# Patient Record
Sex: FEMALE | Race: OTHER | HISPANIC OR LATINO | Employment: OTHER | URBAN - METROPOLITAN AREA
[De-identification: names, ages, dates, MRNs, and addresses within clinical notes are randomized per-mention and may not be internally consistent; named-entity substitution may affect disease eponyms.]

---

## 2023-11-06 PROBLEM — E11.9 DIABETES MELLITUS (MULTI): Status: ACTIVE | Noted: 2023-11-06

## 2023-11-06 PROBLEM — I34.0 MITRAL REGURGITATION: Status: ACTIVE | Noted: 2023-11-06

## 2023-11-06 PROBLEM — U07.1 COVID: Status: ACTIVE | Noted: 2023-11-06

## 2023-11-06 PROBLEM — R00.0 TACHYCARDIA: Status: ACTIVE | Noted: 2023-11-06

## 2023-11-06 PROBLEM — I07.1 TRICUSPID REGURGITATION: Status: ACTIVE | Noted: 2023-11-06

## 2023-11-06 PROBLEM — R94.31 ABNORMAL EKG: Status: ACTIVE | Noted: 2023-11-06

## 2023-11-06 PROBLEM — R73.9 HYPERGLYCEMIA: Status: ACTIVE | Noted: 2023-11-06

## 2023-11-06 PROBLEM — I10 BENIGN ESSENTIAL HYPERTENSION: Status: ACTIVE | Noted: 2023-11-06

## 2023-11-06 PROBLEM — E66.01 MORBID OBESITY WITH BODY MASS INDEX (BMI) OF 40.0 TO 44.9 IN ADULT (MULTI): Status: ACTIVE | Noted: 2023-11-06

## 2023-11-06 PROBLEM — R01.1 HEART MURMUR: Status: ACTIVE | Noted: 2023-11-06

## 2023-11-06 PROBLEM — E78.5 HYPERLIPIDEMIA, UNSPECIFIED: Status: ACTIVE | Noted: 2023-11-06

## 2023-11-06 RX ORDER — ATORVASTATIN CALCIUM 20 MG/1
20 TABLET, FILM COATED ORAL DAILY
COMMUNITY
Start: 2023-01-09 | End: 2024-03-04 | Stop reason: SDUPTHER

## 2023-11-06 RX ORDER — LOSARTAN POTASSIUM 100 MG/1
1 TABLET ORAL DAILY
COMMUNITY
Start: 2022-02-02 | End: 2024-05-31

## 2023-11-06 RX ORDER — KETOROLAC TROMETHAMINE 5 MG/ML
1 SOLUTION OPHTHALMIC
COMMUNITY
Start: 2023-04-04 | End: 2023-11-07 | Stop reason: ALTCHOICE

## 2023-11-06 RX ORDER — LOSARTAN POTASSIUM 100 MG/1
TABLET ORAL
COMMUNITY
Start: 2022-04-05 | End: 2023-11-07 | Stop reason: SDUPTHER

## 2023-11-06 RX ORDER — PREDNISOLONE ACETATE 10 MG/ML
1 SUSPENSION/ DROPS OPHTHALMIC
COMMUNITY
Start: 2023-04-04 | End: 2023-11-07 | Stop reason: ALTCHOICE

## 2023-11-06 RX ORDER — HYDROCHLOROTHIAZIDE 12.5 MG/1
12.5 TABLET ORAL DAILY
COMMUNITY
Start: 2023-04-05 | End: 2024-05-30 | Stop reason: SDUPTHER

## 2023-11-06 RX ORDER — METOPROLOL SUCCINATE 25 MG/1
1 TABLET, EXTENDED RELEASE ORAL DAILY
COMMUNITY
Start: 2022-12-14 | End: 2023-11-07 | Stop reason: ALTCHOICE

## 2023-11-06 RX ORDER — METOPROLOL SUCCINATE 50 MG/1
50 TABLET, EXTENDED RELEASE ORAL DAILY
COMMUNITY
Start: 2023-03-17 | End: 2023-11-07 | Stop reason: SDUPTHER

## 2023-11-07 ENCOUNTER — OFFICE VISIT (OUTPATIENT)
Dept: CARDIOLOGY | Facility: CLINIC | Age: 68
End: 2023-11-07
Payer: MEDICARE

## 2023-11-07 VITALS
DIASTOLIC BLOOD PRESSURE: 86 MMHG | HEART RATE: 72 BPM | SYSTOLIC BLOOD PRESSURE: 130 MMHG | HEIGHT: 61 IN | BODY MASS INDEX: 42.9 KG/M2 | WEIGHT: 227.2 LBS

## 2023-11-07 DIAGNOSIS — R01.1 HEART MURMUR: ICD-10-CM

## 2023-11-07 DIAGNOSIS — I34.0 NONRHEUMATIC MITRAL VALVE REGURGITATION: ICD-10-CM

## 2023-11-07 DIAGNOSIS — E66.01 MORBID OBESITY WITH BODY MASS INDEX (BMI) OF 40.0 TO 44.9 IN ADULT (MULTI): ICD-10-CM

## 2023-11-07 DIAGNOSIS — E78.2 MIXED HYPERLIPIDEMIA: ICD-10-CM

## 2023-11-07 DIAGNOSIS — R94.31 ABNORMAL EKG: ICD-10-CM

## 2023-11-07 DIAGNOSIS — R00.0 TACHYCARDIA: ICD-10-CM

## 2023-11-07 DIAGNOSIS — I10 BENIGN ESSENTIAL HYPERTENSION: Primary | ICD-10-CM

## 2023-11-07 DIAGNOSIS — I36.1 NONRHEUMATIC TRICUSPID VALVE REGURGITATION: ICD-10-CM

## 2023-11-07 DIAGNOSIS — Z87.891 FORMER SMOKER: ICD-10-CM

## 2023-11-07 PROCEDURE — 3008F BODY MASS INDEX DOCD: CPT | Performed by: INTERNAL MEDICINE

## 2023-11-07 PROCEDURE — 3075F SYST BP GE 130 - 139MM HG: CPT | Performed by: INTERNAL MEDICINE

## 2023-11-07 PROCEDURE — 4010F ACE/ARB THERAPY RXD/TAKEN: CPT | Performed by: INTERNAL MEDICINE

## 2023-11-07 PROCEDURE — 99214 OFFICE O/P EST MOD 30 MIN: CPT | Performed by: INTERNAL MEDICINE

## 2023-11-07 PROCEDURE — 3079F DIAST BP 80-89 MM HG: CPT | Performed by: INTERNAL MEDICINE

## 2023-11-07 PROCEDURE — 1159F MED LIST DOCD IN RCRD: CPT | Performed by: INTERNAL MEDICINE

## 2023-11-07 PROCEDURE — 1036F TOBACCO NON-USER: CPT | Performed by: INTERNAL MEDICINE

## 2023-11-07 RX ORDER — METOPROLOL SUCCINATE 50 MG/1
50 TABLET, EXTENDED RELEASE ORAL DAILY
Qty: 90 TABLET | Refills: 3 | Status: SHIPPED | OUTPATIENT
Start: 2023-11-07

## 2023-11-07 ASSESSMENT — ENCOUNTER SYMPTOMS
RESPIRATORY NEGATIVE: 1
CARDIOVASCULAR NEGATIVE: 1
NEUROLOGICAL NEGATIVE: 1
CONSTITUTIONAL NEGATIVE: 1

## 2023-11-07 NOTE — PROGRESS NOTES
CARDIOLOGY OFFICE VISIT      CHIEF COMPLAINT  Chief Complaint   Patient presents with    Follow-up        HISTORY OF PRESENT ILLNESS  Ilda Cronin is a 68 y.o. year old female patient with hypertension resting tachycardia who apparently ran out of her metoprolol and subsequently developed recurrent tachycardia which has not improved since she got some supplies.  Work-up includes an echocardiogram that shows normal LV function with trace mitral and tricuspid regurgitation in January 2023.  She continues to do well denying any chest pain palpitations presyncope or syncope.  She does have increased cholesterol with an LDL of 150 for which she resumed her Lipitor and repeat blood test is pending at this time.    ASSESSMENT AND PLAN  1.  Hypertension: Blood pressures well controlled on current medications which we will continue.  2.  Dyslipidemia: We have requested for repeat lipids and LFTs prior to next follow-up.  3.  Overweight: She has managed to lose over 10 pounds since her last visit and she is encouraged to continue with aggressive weight loss.  4.  Resting tachycardia: We will continue with current dose of metoprolol  Problem List Items Addressed This Visit       Abnormal EKG    Benign essential hypertension - Primary    Heart murmur    Hyperlipidemia, unspecified    Tachycardia    Mitral regurgitation    Morbid obesity with body mass index (BMI) of 40.0 to 44.9 in adult (CMS/[facility])    Tricuspid regurgitation    Former smoker       Past Medical History  History reviewed. No pertinent past medical history.    Social History  Social History     Tobacco Use    Smoking status: Former     Types: Cigarettes    Smokeless tobacco: Never   Substance Use Topics    Alcohol use: Not Currently     Comment: rarely    Drug use: Never       Family History     Family History   Problem Relation Name Age of Onset    Thyroid disease Mother      Other (cardiac pacemaker) Father      Hypertension Father          Allergies:  No Known  "Allergies     Outpatient Medications:  Current Outpatient Medications   Medication Instructions    atorvastatin (Lipitor) 20 mg tablet Take 1 tablet (20 mg) by mouth once daily.    hydroCHLOROthiazide (HYDRODIURIL) 12.5 mg, oral, Daily, In the morning    losartan (Cozaar) 100 mg tablet 1 tablet, oral, Daily    metoprolol succinate XL (Toprol-XL) 50 mg 24 hr tablet 1 tablet (50 mg) once daily.        Recent Lab Results:    CBC:   Lab Results   Component Value Date    WBC 8.1 01/06/2023    RBC 4.90 01/06/2023    HGB 14.7 01/06/2023    HCT 44.8 01/06/2023     01/06/2023        CMP:    Lab Results   Component Value Date     01/06/2023    K 4.3 01/06/2023     01/06/2023    CO2 29 01/06/2023    BUN 13 01/06/2023    CREATININE 0.81 01/06/2023    GLUCOSE 109 (H) 01/06/2023    CALCIUM 9.3 01/06/2023       Magnesium:    No results found for: \"MG\"    Lipid Profile:    Lab Results   Component Value Date    TRIG 81 01/06/2023    HDL 56.7 01/06/2023       TSH:    Lab Results   Component Value Date    TSH 1.24 01/06/2023       BNP:   No results found for: \"BNP\"     PT/INR:    No results found for: \"PROTIME\", \"INR\"    HgBA1c:    Lab Results   Component Value Date    HGBA1C 7.1 (A) 02/02/2022       BMP:  Lab Results   Component Value Date     01/06/2023     02/02/2022    K 4.3 01/06/2023    K 4.1 02/02/2022     01/06/2023     02/02/2022    CO2 29 01/06/2023    CO2 26 02/02/2022    BUN 13 01/06/2023    BUN 11 02/02/2022    CREATININE 0.81 01/06/2023    CREATININE 0.76 02/02/2022       CBC:  Lab Results   Component Value Date    WBC 8.1 01/06/2023    WBC 9.9 02/02/2022    RBC 4.90 01/06/2023    RBC 4.98 02/02/2022    HGB 14.7 01/06/2023    HGB 15.0 02/02/2022    HCT 44.8 01/06/2023    HCT 45.3 02/02/2022    MCV 91 01/06/2023    MCV 91 02/02/2022    MCHC 32.8 01/06/2023    MCHC 33.1 02/02/2022    RDW 12.1 01/06/2023    RDW 12.6 02/02/2022     01/06/2023     02/02/2022 " "      Cardiac Enzymes:    No results found for: \"TROPHS\"    Hepatic Function Panel:    Lab Results   Component Value Date    ALKPHOS 119 01/06/2023    ALT 18 01/06/2023    AST 15 01/06/2023    PROT 7.5 01/06/2023    BILITOT 0.5 01/06/2023       Recent Cardiovascular Testing:    Echo-  Stress-  Cath-    REVIEW OF SYSTEMS  Review of Systems   Constitutional: Negative.   Cardiovascular: Negative.    Respiratory: Negative.     Neurological: Negative.    All other systems reviewed and are negative.      VITALS  Vitals:    11/07/23 1441   BP: 130/86   Pulse: 72     Wt Readings from Last 4 Encounters:   11/07/23 103 kg (227 lb 3.2 oz)   03/17/23 105 kg (232 lb 8 oz)   02/06/23 102 kg (225 lb)   01/30/23 104 kg (228 lb 4 oz)       PHYSICAL EXAM  Constitutional:       Appearance: Healthy appearance. Not in distress.   Eyes:      Conjunctiva/sclera: Conjunctivae normal.      Pupils: Pupils are equal, round, and reactive to light.   Neck:      Vascular: No JVR. JVD normal.   Pulmonary:      Effort: Pulmonary effort is normal.      Breath sounds: Normal breath sounds. No wheezing. No rhonchi. No rales.   Chest:      Chest wall: Not tender to palpatation.   Cardiovascular:      PMI at left midclavicular line. Normal rate. Regular rhythm. Normal S1. Normal S2.       Murmurs: There is no murmur.      No gallop.  No click. No rub.   Pulses:     Intact distal pulses.   Edema:     Peripheral edema absent.   Abdominal:      Tenderness: There is no abdominal tenderness.   Musculoskeletal: Normal range of motion.         General: No tenderness.      Cervical back: Normal range of motion. Skin:     General: Skin is warm and dry.   Neurological:      General: No focal deficit present.      Mental Status: Alert and oriented to person, place and time.             [unfilled]   "

## 2024-02-07 ENCOUNTER — APPOINTMENT (OUTPATIENT)
Dept: CARDIOLOGY | Facility: CLINIC | Age: 69
End: 2024-02-07
Payer: MEDICARE

## 2024-02-21 ENCOUNTER — TELEPHONE (OUTPATIENT)
Dept: PRIMARY CARE | Facility: CLINIC | Age: 69
End: 2024-02-21
Payer: MEDICARE

## 2024-02-21 DIAGNOSIS — E55.9 VITAMIN D INSUFFICIENCY: ICD-10-CM

## 2024-02-21 DIAGNOSIS — Z00.00 ENCOUNTER FOR WELLNESS EXAMINATION: ICD-10-CM

## 2024-02-21 DIAGNOSIS — I10 ESSENTIAL (PRIMARY) HYPERTENSION: Primary | ICD-10-CM

## 2024-02-21 NOTE — TELEPHONE ENCOUNTER
Patient called to request lab orders to be done prior to 3/18/24 upcoming appointment.  Also stated that she has an upcoming appointment with Dr. Muhammad 3/8/24

## 2024-02-27 ENCOUNTER — APPOINTMENT (OUTPATIENT)
Dept: CARDIOLOGY | Facility: CLINIC | Age: 69
End: 2024-02-27
Payer: MEDICARE

## 2024-03-04 DIAGNOSIS — E78.5 HYPERLIPIDEMIA, UNSPECIFIED HYPERLIPIDEMIA TYPE: ICD-10-CM

## 2024-03-04 RX ORDER — ATORVASTATIN CALCIUM 20 MG/1
20 TABLET, FILM COATED ORAL DAILY
Qty: 90 TABLET | Refills: 3 | Status: SHIPPED | OUTPATIENT
Start: 2024-03-04 | End: 2025-03-04

## 2024-03-08 ENCOUNTER — APPOINTMENT (OUTPATIENT)
Dept: CARDIOLOGY | Facility: CLINIC | Age: 69
End: 2024-03-08
Payer: MEDICARE

## 2024-03-12 ENCOUNTER — APPOINTMENT (OUTPATIENT)
Dept: CARDIOLOGY | Facility: CLINIC | Age: 69
End: 2024-03-12
Payer: MEDICARE

## 2024-03-18 ENCOUNTER — APPOINTMENT (OUTPATIENT)
Dept: PRIMARY CARE | Facility: CLINIC | Age: 69
End: 2024-03-18
Payer: MEDICARE

## 2024-03-26 ENCOUNTER — APPOINTMENT (OUTPATIENT)
Dept: CARDIOLOGY | Facility: CLINIC | Age: 69
End: 2024-03-26
Payer: MEDICARE

## 2024-05-30 DIAGNOSIS — I10 BENIGN ESSENTIAL HYPERTENSION: ICD-10-CM

## 2024-05-30 NOTE — TELEPHONE ENCOUNTER
Received request for prescription refill for patient.  Patient follows with Dr. Damian Muhammad MD     Request is for hydrochlorothiazide and losartan  Is patient currently on medication- yes     Last OV- 11/7/23  Next OV- 7/1/24    Pended for signing and sent to provider.

## 2024-05-31 RX ORDER — LOSARTAN POTASSIUM 100 MG/1
100 TABLET ORAL DAILY
Qty: 90 TABLET | Refills: 3 | Status: SHIPPED | OUTPATIENT
Start: 2024-05-31 | End: 2025-05-31

## 2024-05-31 RX ORDER — HYDROCHLOROTHIAZIDE 12.5 MG/1
12.5 TABLET ORAL DAILY
Qty: 90 TABLET | Refills: 3 | Status: SHIPPED | OUTPATIENT
Start: 2024-05-31 | End: 2025-05-31

## 2024-06-10 ENCOUNTER — LAB (OUTPATIENT)
Dept: LAB | Facility: LAB | Age: 69
End: 2024-06-10
Payer: MEDICARE

## 2024-06-10 DIAGNOSIS — Z00.00 ENCOUNTER FOR WELLNESS EXAMINATION: ICD-10-CM

## 2024-06-10 DIAGNOSIS — I10 ESSENTIAL (PRIMARY) HYPERTENSION: ICD-10-CM

## 2024-06-10 DIAGNOSIS — E55.9 VITAMIN D INSUFFICIENCY: ICD-10-CM

## 2024-06-10 LAB
25(OH)D3 SERPL-MCNC: 27 NG/ML (ref 30–100)
ALBUMIN SERPL BCP-MCNC: 4.2 G/DL (ref 3.4–5)
ALP SERPL-CCNC: 121 U/L (ref 33–136)
ALT SERPL W P-5'-P-CCNC: 22 U/L (ref 7–45)
ANION GAP SERPL CALC-SCNC: 12 MMOL/L (ref 10–20)
APPEARANCE UR: CLEAR
AST SERPL W P-5'-P-CCNC: 16 U/L (ref 9–39)
BASOPHILS # BLD AUTO: 0.02 X10*3/UL (ref 0–0.1)
BASOPHILS NFR BLD AUTO: 0.3 %
BILIRUB SERPL-MCNC: 0.5 MG/DL (ref 0–1.2)
BILIRUB UR STRIP.AUTO-MCNC: NEGATIVE MG/DL
BUN SERPL-MCNC: 13 MG/DL (ref 6–23)
CALCIUM SERPL-MCNC: 9.5 MG/DL (ref 8.6–10.3)
CHLORIDE SERPL-SCNC: 102 MMOL/L (ref 98–107)
CHOLEST SERPL-MCNC: 150 MG/DL (ref 0–199)
CHOLESTEROL/HDL RATIO: 3.3
CO2 SERPL-SCNC: 30 MMOL/L (ref 21–32)
COLOR UR: YELLOW
CREAT SERPL-MCNC: 0.84 MG/DL (ref 0.5–1.05)
EGFRCR SERPLBLD CKD-EPI 2021: 75 ML/MIN/1.73M*2
EOSINOPHIL # BLD AUTO: 0.1 X10*3/UL (ref 0–0.7)
EOSINOPHIL NFR BLD AUTO: 1.3 %
ERYTHROCYTE [DISTWIDTH] IN BLOOD BY AUTOMATED COUNT: 12.3 % (ref 11.5–14.5)
GLUCOSE SERPL-MCNC: 132 MG/DL (ref 74–99)
GLUCOSE UR STRIP.AUTO-MCNC: NEGATIVE MG/DL
HCT VFR BLD AUTO: 44.5 % (ref 36–46)
HDLC SERPL-MCNC: 45.3 MG/DL
HGB BLD-MCNC: 15 G/DL (ref 12–16)
IMM GRANULOCYTES # BLD AUTO: 0.01 X10*3/UL (ref 0–0.7)
IMM GRANULOCYTES NFR BLD AUTO: 0.1 % (ref 0–0.9)
KETONES UR STRIP.AUTO-MCNC: NEGATIVE MG/DL
LDLC SERPL CALC-MCNC: 85 MG/DL
LEUKOCYTE ESTERASE UR QL STRIP.AUTO: NEGATIVE
LYMPHOCYTES # BLD AUTO: 3.45 X10*3/UL (ref 1.2–4.8)
LYMPHOCYTES NFR BLD AUTO: 44.2 %
MCH RBC QN AUTO: 31.4 PG (ref 26–34)
MCHC RBC AUTO-ENTMCNC: 33.7 G/DL (ref 32–36)
MCV RBC AUTO: 93 FL (ref 80–100)
MONOCYTES # BLD AUTO: 0.5 X10*3/UL (ref 0.1–1)
MONOCYTES NFR BLD AUTO: 6.4 %
NEUTROPHILS # BLD AUTO: 3.72 X10*3/UL (ref 1.2–7.7)
NEUTROPHILS NFR BLD AUTO: 47.7 %
NITRITE UR QL STRIP.AUTO: NEGATIVE
NON HDL CHOLESTEROL: 105 MG/DL (ref 0–149)
NRBC BLD-RTO: 0 /100 WBCS (ref 0–0)
PH UR STRIP.AUTO: 5 [PH]
PLATELET # BLD AUTO: 293 X10*3/UL (ref 150–450)
POTASSIUM SERPL-SCNC: 4 MMOL/L (ref 3.5–5.3)
PROT SERPL-MCNC: 7 G/DL (ref 6.4–8.2)
PROT UR STRIP.AUTO-MCNC: NEGATIVE MG/DL
RBC # BLD AUTO: 4.78 X10*6/UL (ref 4–5.2)
RBC # UR STRIP.AUTO: NEGATIVE /UL
SODIUM SERPL-SCNC: 140 MMOL/L (ref 136–145)
SP GR UR STRIP.AUTO: 1.02
TRIGL SERPL-MCNC: 99 MG/DL (ref 0–149)
TSH SERPL-ACNC: 0.66 MIU/L (ref 0.44–3.98)
UROBILINOGEN UR STRIP.AUTO-MCNC: <2 MG/DL
VLDL: 20 MG/DL (ref 0–40)
WBC # BLD AUTO: 7.8 X10*3/UL (ref 4.4–11.3)

## 2024-06-10 PROCEDURE — 80053 COMPREHEN METABOLIC PANEL: CPT

## 2024-06-10 PROCEDURE — 82306 VITAMIN D 25 HYDROXY: CPT

## 2024-06-10 PROCEDURE — 80061 LIPID PANEL: CPT

## 2024-06-10 PROCEDURE — 36415 COLL VENOUS BLD VENIPUNCTURE: CPT

## 2024-06-10 PROCEDURE — 84443 ASSAY THYROID STIM HORMONE: CPT

## 2024-06-10 PROCEDURE — 81003 URINALYSIS AUTO W/O SCOPE: CPT

## 2024-06-10 PROCEDURE — 85025 COMPLETE CBC W/AUTO DIFF WBC: CPT

## 2024-06-11 ENCOUNTER — OFFICE VISIT (OUTPATIENT)
Dept: PRIMARY CARE | Facility: CLINIC | Age: 69
End: 2024-06-11
Payer: MEDICARE

## 2024-06-11 VITALS
HEIGHT: 62 IN | HEART RATE: 67 BPM | SYSTOLIC BLOOD PRESSURE: 162 MMHG | WEIGHT: 232.2 LBS | TEMPERATURE: 97.6 F | OXYGEN SATURATION: 96 % | DIASTOLIC BLOOD PRESSURE: 78 MMHG | BODY MASS INDEX: 42.73 KG/M2

## 2024-06-11 DIAGNOSIS — Z78.0 ENCOUNTER FOR OSTEOPOROSIS SCREENING IN ASYMPTOMATIC POSTMENOPAUSAL PATIENT: ICD-10-CM

## 2024-06-11 DIAGNOSIS — E66.01 MORBID OBESITY WITH BODY MASS INDEX (BMI) OF 40.0 TO 44.9 IN ADULT (MULTI): ICD-10-CM

## 2024-06-11 DIAGNOSIS — R73.9 HYPERGLYCEMIA: Primary | ICD-10-CM

## 2024-06-11 DIAGNOSIS — E78.5 HYPERLIPIDEMIA, UNSPECIFIED HYPERLIPIDEMIA TYPE: ICD-10-CM

## 2024-06-11 DIAGNOSIS — Z12.31 SCREENING MAMMOGRAM FOR BREAST CANCER: ICD-10-CM

## 2024-06-11 DIAGNOSIS — Z13.1 SCREENING FOR DIABETES MELLITUS (DM): ICD-10-CM

## 2024-06-11 DIAGNOSIS — M81.0 AGE-RELATED OSTEOPOROSIS WITHOUT CURRENT PATHOLOGICAL FRACTURE: ICD-10-CM

## 2024-06-11 DIAGNOSIS — Z00.00 ENCOUNTER FOR SUBSEQUENT ANNUAL WELLNESS VISIT (AWV) IN MEDICARE PATIENT: Primary | ICD-10-CM

## 2024-06-11 DIAGNOSIS — Z13.820 ENCOUNTER FOR OSTEOPOROSIS SCREENING IN ASYMPTOMATIC POSTMENOPAUSAL PATIENT: ICD-10-CM

## 2024-06-11 DIAGNOSIS — Z12.11 SCREENING FOR COLON CANCER: ICD-10-CM

## 2024-06-11 LAB — POC HEMOGLOBIN A1C: 6.8 % (ref 4.2–6.5)

## 2024-06-11 PROCEDURE — 3077F SYST BP >= 140 MM HG: CPT | Performed by: INTERNAL MEDICINE

## 2024-06-11 PROCEDURE — 4010F ACE/ARB THERAPY RXD/TAKEN: CPT | Performed by: INTERNAL MEDICINE

## 2024-06-11 PROCEDURE — 3008F BODY MASS INDEX DOCD: CPT | Performed by: INTERNAL MEDICINE

## 2024-06-11 PROCEDURE — 3048F LDL-C <100 MG/DL: CPT | Performed by: INTERNAL MEDICINE

## 2024-06-11 PROCEDURE — 1170F FXNL STATUS ASSESSED: CPT | Performed by: INTERNAL MEDICINE

## 2024-06-11 PROCEDURE — 83036 HEMOGLOBIN GLYCOSYLATED A1C: CPT | Performed by: INTERNAL MEDICINE

## 2024-06-11 PROCEDURE — 3078F DIAST BP <80 MM HG: CPT | Performed by: INTERNAL MEDICINE

## 2024-06-11 PROCEDURE — 1124F ACP DISCUSS-NO DSCNMKR DOCD: CPT | Performed by: INTERNAL MEDICINE

## 2024-06-11 PROCEDURE — 1159F MED LIST DOCD IN RCRD: CPT | Performed by: INTERNAL MEDICINE

## 2024-06-11 PROCEDURE — 99397 PER PM REEVAL EST PAT 65+ YR: CPT | Performed by: INTERNAL MEDICINE

## 2024-06-11 ASSESSMENT — ACTIVITIES OF DAILY LIVING (ADL)
DOING_HOUSEWORK: INDEPENDENT
DRESSING: INDEPENDENT
MANAGING_FINANCES: INDEPENDENT
GROCERY_SHOPPING: INDEPENDENT
BATHING: INDEPENDENT
TAKING_MEDICATION: INDEPENDENT

## 2024-06-11 ASSESSMENT — ENCOUNTER SYMPTOMS
DEPRESSION: 0
LOSS OF SENSATION IN FEET: 0
OCCASIONAL FEELINGS OF UNSTEADINESS: 1

## 2024-06-11 ASSESSMENT — PATIENT HEALTH QUESTIONNAIRE - PHQ9
2. FEELING DOWN, DEPRESSED OR HOPELESS: NOT AT ALL
SUM OF ALL RESPONSES TO PHQ9 QUESTIONS 1 AND 2: 0
1. LITTLE INTEREST OR PLEASURE IN DOING THINGS: NOT AT ALL

## 2024-06-11 NOTE — PROGRESS NOTES
"CHIEF COMPLAINT:   Chief Complaint   Patient presents with    Annual Wellness Visit         HISTORY OF PRESENT ILLNESS:   Ilda Cronin is otherwise doing well. Chronic medical conditions are stable with current medical regimen. Cognitive functions are intact and stable. Immunizations are age appropriately up-to-date. Today patient does not have any acute medical complaint. We reconciled home medications. . Discussed about the preventative kojo like cancer screening, routine blood work, immunizations. The healthy lifestyle has been reinforced. Encouraged continued avoidance of tobacco alcohol substances of abuse. Functional capacity has been assessed. The depression screening questionnaire has been reviewed, spent 5 minutes discussing with patient. . Discussed safety measures and advanced directives, Med refills were sent.  Vital signs reviewed.  The due lab orders, if any were provided.  All the other components of annual wellness has been further narrated below. Patient will return for follow up as per schedule.       Review of Systems   Constitutional:  Negative for activity change and fever.   HENT:  Negative for hearing loss, nosebleeds and tinnitus.    Eyes:  Negative for redness.   Respiratory:  Negative for chest tightness and stridor.    Cardiovascular:  Negative for chest pain, palpitations and leg swelling.   Gastrointestinal:  Negative for blood in stool.   Endocrine: Negative for cold intolerance.   Genitourinary:  Negative for hematuria.   Musculoskeletal:  Negative for joint swelling.   Skin:  Negative for rash.   Neurological:  Negative for speech difficulty and light-headedness.   Psychiatric/Behavioral:  Negative for behavioral problems.        Visit Vitals  /78 (BP Location: Left arm, Patient Position: Sitting)   Pulse 67   Temp 36.4 °C (97.6 °F)   Ht 1.562 m (5' 1.5\")   Wt 105 kg (232 lb 3.2 oz)   SpO2 96% Comment: RA   BMI 43.16 kg/m²   Smoking Status Former   BSA 2.13 m²           Wt " Readings from Last 10 Encounters:   06/11/24 105 kg (232 lb 3.2 oz)   11/07/23 103 kg (227 lb 3.2 oz)   03/17/23 105 kg (232 lb 8 oz)   02/06/23 102 kg (225 lb)   01/30/23 104 kg (228 lb 4 oz)   01/09/23 104 kg (228 lb 3 oz)   12/14/22 102 kg (225 lb)   02/02/22 107 kg (236 lb)        Physical Exam  Constitutional:       General: She is not in acute distress.     Appearance: Normal appearance.   HENT:      Head: Normocephalic.      Right Ear: Tympanic membrane normal.      Left Ear: Tympanic membrane normal.      Mouth/Throat:      Mouth: Mucous membranes are moist.   Cardiovascular:      Rate and Rhythm: Normal rate and regular rhythm.      Heart sounds: No murmur heard.  Pulmonary:      Effort: No respiratory distress.   Abdominal:      Palpations: Abdomen is soft.   Musculoskeletal:      Cervical back: Neck supple.      Right lower leg: No edema.      Left lower leg: No edema.   Skin:     Findings: No rash.   Neurological:      General: No focal deficit present.      Mental Status: She is alert and oriented to person, place, and time.   Psychiatric:         Mood and Affect: Mood normal.            RECENT LABS:  Lab Results   Component Value Date    WBC 7.8 06/10/2024    HGB 15.0 06/10/2024    HCT 44.5 06/10/2024     06/10/2024    CHOL 150 06/10/2024    TRIG 99 06/10/2024    HDL 45.3 06/10/2024    ALT 22 06/10/2024    AST 16 06/10/2024     06/10/2024    K 4.0 06/10/2024     06/10/2024    CREATININE 0.84 06/10/2024    BUN 13 06/10/2024    CO2 30 06/10/2024    TSH 0.66 06/10/2024    HGBA1C 6.8 (A) 06/11/2024       IMAGING:  No results found.     MEDICATIONS:   Current Outpatient Medications   Medication Instructions    atorvastatin (LIPITOR) 20 mg, oral, Daily    ergocalciferol (VITAMIN D-2) 50,000 Units, oral, Once Weekly    hydroCHLOROthiazide (MICROZIDE) 12.5 mg, oral, Daily, In the morning    losartan (COZAAR) 100 mg, oral, Daily    metoprolol succinate XL (TOPROL-XL) 50 mg, oral, Daily         TODAY'S VISIT  DX:   1. Encounter for subsequent annual wellness visit (AWV) in Medicare patient        2. Morbid obesity with body mass index (BMI) of 40.0 to 44.9 in adult (Multi)        3. Hyperglycemia  POCT glycosylated hemoglobin (Hb A1C) manually resulted      4. Screening for diabetes mellitus (DM)  POCT glycosylated hemoglobin (Hb A1C) manually resulted      5. Screening mammogram for breast cancer  BI mammo bilateral screening tomosynthesis      6. Screening for colon cancer  Colonoscopy Screening; Average Risk Patient      7. Age-related osteoporosis without current pathological fracture  XR DEXA bone density      8. Encounter for osteoporosis screening in asymptomatic postmenopausal patient  XR DEXA bone density      9. Hyperlipidemia, unspecified hyperlipidemia type               MEDICAL DECISION MAKING:   Recent lab work and relevant imaging studies have been reviewed.  Relevant correspondence/notes from specialty consultants were reviewed and discussed with patient.  The current active medical co morbidities have been considered.  Patient's cancer screening tests are up-to-date.  Medication have been sent for refill.  Patient will continue with current medical therapy and plan.  Immunizations are up-to-date.  Relevant orders are in the chart for this visit.  I will see patient back in 3 months.      PS: This note was completed using Dragon voice recognition technology and may include unintended errors with respect to translation of words, typographical errors or grammar errors which may not have been identified while finalizing the chart.

## 2024-06-14 RX ORDER — ERGOCALCIFEROL 1.25 MG/1
50000 CAPSULE ORAL
Qty: 12 CAPSULE | Refills: 1 | Status: SHIPPED | OUTPATIENT
Start: 2024-06-16 | End: 2025-06-16

## 2024-07-01 ENCOUNTER — APPOINTMENT (OUTPATIENT)
Dept: CARDIOLOGY | Facility: CLINIC | Age: 69
End: 2024-07-01
Payer: MEDICARE

## 2024-07-01 VITALS
HEART RATE: 80 BPM | BODY MASS INDEX: 42.07 KG/M2 | WEIGHT: 228.6 LBS | SYSTOLIC BLOOD PRESSURE: 172 MMHG | HEIGHT: 62 IN | DIASTOLIC BLOOD PRESSURE: 104 MMHG

## 2024-07-01 DIAGNOSIS — I34.0 NONRHEUMATIC MITRAL VALVE REGURGITATION: ICD-10-CM

## 2024-07-01 DIAGNOSIS — E66.01 MORBID OBESITY WITH BODY MASS INDEX (BMI) OF 40.0 TO 44.9 IN ADULT (MULTI): ICD-10-CM

## 2024-07-01 DIAGNOSIS — R00.0 TACHYCARDIA: ICD-10-CM

## 2024-07-01 DIAGNOSIS — Z87.891 FORMER SMOKER: ICD-10-CM

## 2024-07-01 DIAGNOSIS — I10 BENIGN ESSENTIAL HYPERTENSION: ICD-10-CM

## 2024-07-01 DIAGNOSIS — R01.1 HEART MURMUR: ICD-10-CM

## 2024-07-01 DIAGNOSIS — I36.1 NONRHEUMATIC TRICUSPID VALVE REGURGITATION: ICD-10-CM

## 2024-07-01 DIAGNOSIS — E08.00 DIABETES MELLITUS DUE TO UNDERLYING CONDITION WITH HYPEROSMOLARITY WITHOUT COMA, WITHOUT LONG-TERM CURRENT USE OF INSULIN (MULTI): ICD-10-CM

## 2024-07-01 PROCEDURE — 99214 OFFICE O/P EST MOD 30 MIN: CPT | Performed by: INTERNAL MEDICINE

## 2024-07-01 PROCEDURE — 3008F BODY MASS INDEX DOCD: CPT | Performed by: INTERNAL MEDICINE

## 2024-07-01 PROCEDURE — 3077F SYST BP >= 140 MM HG: CPT | Performed by: INTERNAL MEDICINE

## 2024-07-01 PROCEDURE — 1159F MED LIST DOCD IN RCRD: CPT | Performed by: INTERNAL MEDICINE

## 2024-07-01 PROCEDURE — 3080F DIAST BP >= 90 MM HG: CPT | Performed by: INTERNAL MEDICINE

## 2024-07-01 PROCEDURE — 1036F TOBACCO NON-USER: CPT | Performed by: INTERNAL MEDICINE

## 2024-07-01 PROCEDURE — 3048F LDL-C <100 MG/DL: CPT | Performed by: INTERNAL MEDICINE

## 2024-07-01 RX ORDER — LOSARTAN POTASSIUM AND HYDROCHLOROTHIAZIDE 25; 100 MG/1; MG/1
1 TABLET ORAL DAILY
Qty: 30 TABLET | Refills: 11 | Status: SHIPPED | OUTPATIENT
Start: 2024-07-01 | End: 2025-07-01

## 2024-07-01 ASSESSMENT — ENCOUNTER SYMPTOMS
CHEST TIGHTNESS: 0
CONSTITUTIONAL NEGATIVE: 1
EYE REDNESS: 0
PALPITATIONS: 0
SPEECH DIFFICULTY: 0
CARDIOVASCULAR NEGATIVE: 1
RESPIRATORY NEGATIVE: 1
ACTIVITY CHANGE: 0
STRIDOR: 0
NEUROLOGICAL NEGATIVE: 1
LIGHT-HEADEDNESS: 0
BLOOD IN STOOL: 0
JOINT SWELLING: 0
HEMATURIA: 0
FEVER: 0

## 2024-07-01 NOTE — PROGRESS NOTES
CARDIOLOGY OFFICE VISIT      CHIEF COMPLAINT  Chief Complaint   Patient presents with    Follow-up     6-7 month follow up to discuss metoprolol         HISTORY OF PRESENT ILLNESS  Ilda Cronin is a 69 y.o. year old female patient with hypertension, resting tachycardia and moderate obesity [place] presents for follow-up.  She apparently has not been very diligent with her blood pressure medications so her blood pressures were running high.  She had an echocardiogram that shows normal LV function with trace mitral and tricuspid regurgitation in January 2023.  She continues to deny any chest pain or significant shortness of breath.  She had a history of dyslipidemia for which she is on lipid-lowering therapy and recent labs from June 2024 shows total cholesterol is 150 which is down from 223, HDL is 45 LDL is 85 which is down from 150 and triglyceride is 99.    ASSESSMENT AND PLAN:  1.  Hypertension: Blood pressure is suboptimally controlled likely secondary to noncompliance.  Will switch to the losartan HCTZ combo for ease of administration once a day, and we will increase the HCTZ component to 25 mg and will continue with metoprolol at the current dose.  Will reevaluate in a couple of weeks with repeat metabolic profile.  2.  Dyslipidemia: Lipid profile is acceptable on current medications which we will continue.  3.  Overweight: She is encouraged to lose weight and exercise.      Problem List Items Addressed This Visit       Benign essential hypertension    Heart murmur    Tachycardia    Diabetes mellitus (Multi)    Mitral regurgitation    Morbid obesity with body mass index (BMI) of 40.0 to 44.9 in adult (Multi)    Tricuspid regurgitation    Former smoker       Recent Cardiovascular Testing:    Echo-  Stress-  Cath-  Carotid Ultrasound-    Past Medical History  Past Medical History:   Diagnosis Date    Diabetes mellitus (Multi) 2014    Heart murmur 1954    Hypertension 2005       Social History  Social History  Discussed lid hygiene, warm compress and eyelid wash. "    Tobacco Use    Smoking status: Former     Current packs/day: 0.00     Types: Cigarettes     Start date:      Quit date: 3/8/1995     Years since quittin.3     Passive exposure: Never    Smokeless tobacco: Never   Vaping Use    Vaping status: Never Used   Substance Use Topics    Alcohol use: Not Currently     Comment: Occassional    Drug use: Never       Family History     Family History   Problem Relation Name Age of Onset    Thyroid disease Mother Brittanie Allen     Other (cardiac pacemaker) Father Tra McLat Sr     Hypertension Father Marcos Mehtaat Sr     Heart disease Father Marcos Mehtaat Sr     Heart disease Paternal Grandfather Adolph Mehtaat Scott     Hypertension Paternal Grandfather Adolph Allen Scott     Cancer Sister Kings McLnagi     Hypertension Sister Kings Mehtaat     Heart disease Brother Marcos Mehtaat II     Heart disease Brother Terence Allen         Allergies:  Allergies   Allergen Reactions    Chlorpheniramine-Pseudoephed Other and Unknown     \"Anesthesia\"--patient's mouth wouldn't open-UNSURE NAME OF DRUG-WILL CALL TODAY WITH NAME    \"Anesthesia\"--patient's mouth wouldn't open        Outpatient Medications:  Current Outpatient Medications   Medication Instructions    atorvastatin (LIPITOR) 20 mg, oral, Daily    ergocalciferol (VITAMIN D-2) 50,000 Units, oral, Once Weekly    hydroCHLOROthiazide (MICROZIDE) 12.5 mg, oral, Daily, In the morning    losartan (COZAAR) 100 mg, oral, Daily    metoprolol succinate XL (TOPROL-XL) 50 mg, oral, Daily        Recent Lab Results:    CBC:   Lab Results   Component Value Date    WBC 7.8 06/10/2024    RBC 4.78 06/10/2024    HGB 15.0 06/10/2024    HCT 44.5 06/10/2024     06/10/2024        CMP:    Lab Results   Component Value Date     06/10/2024    K 4.0 06/10/2024     06/10/2024    CO2 30 06/10/2024    BUN 13 06/10/2024    CREATININE 0.84 06/10/2024    GLUCOSE 132 (H) 06/10/2024    CALCIUM 9.5 06/10/2024       Magnesium:    No results " "found for: \"MG\"    Lipid Profile:    Lab Results   Component Value Date    TRIG 99 06/10/2024    HDL 45.3 06/10/2024    LDLCALC 85 06/10/2024       TSH:    Lab Results   Component Value Date    TSH 0.66 06/10/2024       BNP:   No results found for: \"BNP\"     PT/INR:    No results found for: \"PROTIME\", \"INR\"    HgBA1c:    Lab Results   Component Value Date    HGBA1C 6.8 (A) 06/11/2024       BMP:  Lab Results   Component Value Date     06/10/2024     01/06/2023     02/02/2022    K 4.0 06/10/2024    K 4.3 01/06/2023    K 4.1 02/02/2022     06/10/2024     01/06/2023     02/02/2022    CO2 30 06/10/2024    CO2 29 01/06/2023    CO2 26 02/02/2022    BUN 13 06/10/2024    BUN 13 01/06/2023    BUN 11 02/02/2022    CREATININE 0.84 06/10/2024    CREATININE 0.81 01/06/2023    CREATININE 0.76 02/02/2022       CBC:  Lab Results   Component Value Date    WBC 7.8 06/10/2024    WBC 8.1 01/06/2023    WBC 9.9 02/02/2022    RBC 4.78 06/10/2024    RBC 4.90 01/06/2023    RBC 4.98 02/02/2022    HGB 15.0 06/10/2024    HGB 14.7 01/06/2023    HGB 15.0 02/02/2022    HCT 44.5 06/10/2024    HCT 44.8 01/06/2023    HCT 45.3 02/02/2022    MCV 93 06/10/2024    MCV 91 01/06/2023    MCV 91 02/02/2022    MCH 31.4 06/10/2024    MCHC 33.7 06/10/2024    MCHC 32.8 01/06/2023    MCHC 33.1 02/02/2022    RDW 12.3 06/10/2024    RDW 12.1 01/06/2023    RDW 12.6 02/02/2022     06/10/2024     01/06/2023     02/02/2022       Cardiac Enzymes:    No results found for: \"TROPHS\"    Hepatic Function Panel:    Lab Results   Component Value Date    ALKPHOS 121 06/10/2024    ALT 22 06/10/2024    AST 16 06/10/2024    PROT 7.0 06/10/2024    BILITOT 0.5 06/10/2024         REVIEW OF SYSTEMS  Review of Systems   Constitutional: Negative.   Cardiovascular: Negative.    Respiratory: Negative.     Neurological: Negative.        VITALS  Vitals:    07/01/24 0823   BP: (!) 172/104   Pulse: 80     Wt Readings from Last 4 " Encounters:   07/01/24 104 kg (228 lb 9.6 oz)   06/11/24 105 kg (232 lb 3.2 oz)   11/07/23 103 kg (227 lb 3.2 oz)   03/17/23 105 kg (232 lb 8 oz)       PHYSICAL EXAM  Constitutional:       Appearance: Healthy appearance. Not in distress.   Eyes:      Conjunctiva/sclera: Conjunctivae normal.      Pupils: Pupils are equal, round, and reactive to light.   Neck:      Vascular: No JVR. JVD normal.   Pulmonary:      Effort: Pulmonary effort is normal.      Breath sounds: Normal breath sounds. No wheezing. No rhonchi. No rales.   Chest:      Chest wall: Not tender to palpatation.   Cardiovascular:      PMI at left midclavicular line. Normal rate. Regular rhythm. Normal S1. Normal S2.       Murmurs: There is no murmur.      No gallop.  No click. No rub.   Pulses:     Intact distal pulses.   Edema:     Peripheral edema absent.   Abdominal:      Tenderness: There is no abdominal tenderness.   Musculoskeletal: Normal range of motion.         General: No tenderness.      Cervical back: Normal range of motion. Skin:     General: Skin is warm and dry.   Neurological:      General: No focal deficit present.      Mental Status: Alert and oriented to person, place and time.

## 2024-07-22 ENCOUNTER — APPOINTMENT (OUTPATIENT)
Dept: CARDIOLOGY | Facility: CLINIC | Age: 69
End: 2024-07-22
Payer: MEDICARE

## 2024-08-05 ENCOUNTER — APPOINTMENT (OUTPATIENT)
Dept: RADIOLOGY | Facility: HOSPITAL | Age: 69
End: 2024-08-05
Payer: MEDICARE

## 2024-09-12 ENCOUNTER — APPOINTMENT (OUTPATIENT)
Dept: PRIMARY CARE | Facility: CLINIC | Age: 69
End: 2024-09-12
Payer: MEDICARE

## 2024-09-16 ENCOUNTER — APPOINTMENT (OUTPATIENT)
Dept: PRIMARY CARE | Facility: CLINIC | Age: 69
End: 2024-09-16
Payer: MEDICARE

## 2024-10-08 ENCOUNTER — HOSPITAL ENCOUNTER (OUTPATIENT)
Dept: RADIOLOGY | Facility: HOSPITAL | Age: 69
Discharge: HOME | End: 2024-10-08
Payer: MEDICARE

## 2024-10-08 DIAGNOSIS — Z13.820 ENCOUNTER FOR OSTEOPOROSIS SCREENING IN ASYMPTOMATIC POSTMENOPAUSAL PATIENT: ICD-10-CM

## 2024-10-08 DIAGNOSIS — Z78.0 ENCOUNTER FOR OSTEOPOROSIS SCREENING IN ASYMPTOMATIC POSTMENOPAUSAL PATIENT: ICD-10-CM

## 2024-10-08 DIAGNOSIS — Z12.31 SCREENING MAMMOGRAM FOR BREAST CANCER: ICD-10-CM

## 2024-10-08 DIAGNOSIS — M81.0 AGE-RELATED OSTEOPOROSIS WITHOUT CURRENT PATHOLOGICAL FRACTURE: ICD-10-CM

## 2024-10-08 PROCEDURE — 77080 DXA BONE DENSITY AXIAL: CPT

## 2024-10-08 PROCEDURE — 77063 BREAST TOMOSYNTHESIS BI: CPT | Performed by: RADIOLOGY

## 2024-10-08 PROCEDURE — 77067 SCR MAMMO BI INCL CAD: CPT

## 2024-10-08 PROCEDURE — 77067 SCR MAMMO BI INCL CAD: CPT | Performed by: RADIOLOGY

## 2024-10-21 ENCOUNTER — HOSPITAL ENCOUNTER (OUTPATIENT)
Dept: RADIOLOGY | Facility: EXTERNAL LOCATION | Age: 69
Discharge: HOME | End: 2024-10-21
Payer: MEDICARE

## 2024-12-11 DIAGNOSIS — I10 BENIGN ESSENTIAL HYPERTENSION: ICD-10-CM

## 2024-12-11 DIAGNOSIS — R01.1 HEART MURMUR: ICD-10-CM

## 2024-12-11 DIAGNOSIS — R00.0 TACHYCARDIA: ICD-10-CM

## 2024-12-12 NOTE — TELEPHONE ENCOUNTER
Received request for prescription refills for patient.   Patient follows with Dr. Muhammad    Request is for Toprol XL  Is patient currently on medication yes    Last OV 7/1/2024  Next OV 1/28/2025    Pended for signing and sent to provider

## 2024-12-13 RX ORDER — METOPROLOL SUCCINATE 50 MG/1
50 TABLET, EXTENDED RELEASE ORAL DAILY
Qty: 90 TABLET | Refills: 3 | Status: SHIPPED | OUTPATIENT
Start: 2024-12-13

## 2024-12-31 ENCOUNTER — LAB (OUTPATIENT)
Dept: LAB | Facility: LAB | Age: 69
End: 2024-12-31
Payer: MEDICARE

## 2024-12-31 DIAGNOSIS — I10 BENIGN ESSENTIAL HYPERTENSION: ICD-10-CM

## 2024-12-31 LAB
ANION GAP SERPL CALC-SCNC: 12 MMOL/L (ref 10–20)
BUN SERPL-MCNC: 16 MG/DL (ref 6–23)
CALCIUM SERPL-MCNC: 9.7 MG/DL (ref 8.6–10.3)
CHLORIDE SERPL-SCNC: 99 MMOL/L (ref 98–107)
CO2 SERPL-SCNC: 32 MMOL/L (ref 21–32)
CREAT SERPL-MCNC: 0.9 MG/DL (ref 0.5–1.05)
EGFRCR SERPLBLD CKD-EPI 2021: 69 ML/MIN/1.73M*2
GLUCOSE SERPL-MCNC: 120 MG/DL (ref 74–99)
POTASSIUM SERPL-SCNC: 3.8 MMOL/L (ref 3.5–5.3)
SODIUM SERPL-SCNC: 139 MMOL/L (ref 136–145)

## 2024-12-31 PROCEDURE — 80048 BASIC METABOLIC PNL TOTAL CA: CPT

## 2025-01-03 ENCOUNTER — TELEPHONE (OUTPATIENT)
Dept: CARDIOLOGY | Facility: CLINIC | Age: 70
End: 2025-01-03
Payer: MEDICARE

## 2025-01-03 NOTE — TELEPHONE ENCOUNTER
Per Dr. Sabina MD- patient lab work is good. Continue same medications and keep routine follow up as scheduled.

## 2025-01-14 ENCOUNTER — APPOINTMENT (OUTPATIENT)
Dept: PRIMARY CARE | Facility: CLINIC | Age: 70
End: 2025-01-14
Payer: MEDICARE

## 2025-01-14 VITALS
WEIGHT: 224.8 LBS | BODY MASS INDEX: 41.79 KG/M2 | DIASTOLIC BLOOD PRESSURE: 90 MMHG | OXYGEN SATURATION: 95 % | HEART RATE: 84 BPM | TEMPERATURE: 97.9 F | SYSTOLIC BLOOD PRESSURE: 126 MMHG

## 2025-01-14 DIAGNOSIS — E11.36 TYPE 2 DIABETES MELLITUS WITH DIABETIC CATARACT, WITHOUT LONG-TERM CURRENT USE OF INSULIN: Primary | ICD-10-CM

## 2025-01-14 DIAGNOSIS — R73.9 HYPERGLYCEMIA: ICD-10-CM

## 2025-01-14 LAB — POC HEMOGLOBIN A1C: 7.6 % (ref 4.2–6.5)

## 2025-01-14 PROCEDURE — G2211 COMPLEX E/M VISIT ADD ON: HCPCS | Performed by: INTERNAL MEDICINE

## 2025-01-14 PROCEDURE — 1036F TOBACCO NON-USER: CPT | Performed by: INTERNAL MEDICINE

## 2025-01-14 PROCEDURE — 1160F RVW MEDS BY RX/DR IN RCRD: CPT | Performed by: INTERNAL MEDICINE

## 2025-01-14 PROCEDURE — 99214 OFFICE O/P EST MOD 30 MIN: CPT | Performed by: INTERNAL MEDICINE

## 2025-01-14 PROCEDURE — 3080F DIAST BP >= 90 MM HG: CPT | Performed by: INTERNAL MEDICINE

## 2025-01-14 PROCEDURE — 1124F ACP DISCUSS-NO DSCNMKR DOCD: CPT | Performed by: INTERNAL MEDICINE

## 2025-01-14 PROCEDURE — 3074F SYST BP LT 130 MM HG: CPT | Performed by: INTERNAL MEDICINE

## 2025-01-14 PROCEDURE — 83036 HEMOGLOBIN GLYCOSYLATED A1C: CPT | Performed by: INTERNAL MEDICINE

## 2025-01-14 PROCEDURE — 1159F MED LIST DOCD IN RCRD: CPT | Performed by: INTERNAL MEDICINE

## 2025-01-14 RX ORDER — METFORMIN HYDROCHLORIDE 1000 MG/1
1000 TABLET ORAL
Qty: 100 TABLET | Refills: 3 | Status: SHIPPED | OUTPATIENT
Start: 2025-01-14 | End: 2026-02-18

## 2025-01-14 ASSESSMENT — ENCOUNTER SYMPTOMS
CHEST TIGHTNESS: 0
HEMATURIA: 0
PALPITATIONS: 0
STRIDOR: 0
BLOOD IN STOOL: 0
SPEECH DIFFICULTY: 0
LIGHT-HEADEDNESS: 0
FEVER: 0
JOINT SWELLING: 0
EYE REDNESS: 0
ACTIVITY CHANGE: 0

## 2025-01-14 ASSESSMENT — PATIENT HEALTH QUESTIONNAIRE - PHQ9
SUM OF ALL RESPONSES TO PHQ9 QUESTIONS 1 AND 2: 0
1. LITTLE INTEREST OR PLEASURE IN DOING THINGS: NOT AT ALL
2. FEELING DOWN, DEPRESSED OR HOPELESS: NOT AT ALL

## 2025-01-14 NOTE — PROGRESS NOTES
Ilda Cronin, [facility] 69 y.o. female was seen today:     Chief Complaint   Patient presents with    Follow-up       VISIT SUMMERY:    Patient presents with left ear itching and wet feeling. She reports that she has had this before as well as seasonal allergies. This is likely due to allergies. Patient started on metformin due to elevated A1c. For her ear, patient will try white vinegar for possible fungal infection of left ear. She will follow up.       HPI    Patient with PMHx of HTN, HLD, DM reports left ear itching and wet feeling which occurs in the morning after she wakes up onset several days ago. Patient sleeps on her left side. She has had this happen before. She notes a Hx of seasonal allergies for which she does not take medications. Patient denies fever, chills, CP, SOB, abdominal pain, N/V, urinary burning/stinging, constipation, diarrhea, or leg swelling.     Patient's last mammogram and DEXA 6 months ago. She is not interested in having a colonoscopy.         MEDICATIONS:   Current Outpatient Medications   Medication Instructions    atorvastatin (LIPITOR) 20 mg, oral, Daily    ergocalciferol (VITAMIN D-2) 50,000 Units, oral, Once Weekly    losartan-hydrochlorothiazide (Hyzaar) 100-25 mg tablet 1 tablet, oral, Daily    metFORMIN (GLUCOPHAGE) 1,000 mg, oral, 2 times daily (morning and late afternoon)    metoprolol succinate XL (TOPROL-XL) 50 mg, oral, Daily       TODAY'S VISIT  DX:     1. Type 2 diabetes mellitus with diabetic cataract, without long-term current use of insulin  A1c today in the office is 7.6%.  -Patient with diabetes not on medication.     Start metFORMIN (Glucophage) 1,000 mg tablet      2. Body mass index (BMI) 40.0-44.9, adult (Multi)  Increase activity and decrease calorie consumption.      3. Hyperglycemia  Patient will keep a diary of blood sugar at home.  We started her on metformin follow-up in 3 months           MEDICAL DECISION MAKING:  - Treatment and therapy plan are as  above   - Active medical co morbidities have been considered.   - Recent lab work and relevant imaging studies were reviewed.    - Correspondence/notes from specialty consultants were checked.    - Next Follow up in 3 months.    Review of Systems   Constitutional:  Negative for activity change and fever.   HENT:  Negative for hearing loss, nosebleeds and tinnitus.    Eyes:  Negative for redness.   Respiratory:  Negative for chest tightness and stridor.    Cardiovascular:  Negative for chest pain, palpitations and leg swelling.   Gastrointestinal:  Negative for blood in stool.   Endocrine: Negative for cold intolerance.   Genitourinary:  Negative for hematuria.   Musculoskeletal:  Negative for joint swelling.   Skin:  Negative for rash.   Neurological:  Negative for speech difficulty and light-headedness.   Psychiatric/Behavioral:  Negative for behavioral problems.          Visit Vitals  /90 (BP Location: Left arm, Patient Position: Sitting, BP Cuff Size: Large adult)   Pulse 84   Temp 36.6 °C (97.9 °F) (Temporal)   Wt 102 kg (224 lb 12.8 oz)   LMP  (LMP Unknown)   SpO2 95% Comment: RA   BMI 41.79 kg/m²   OB Status Hysterectomy   Smoking Status Former   BSA 2.1 m²         Wt Readings from Last 10 Encounters:   01/14/25 102 kg (224 lb 12.8 oz)   07/01/24 104 kg (228 lb 9.6 oz)   06/11/24 105 kg (232 lb 3.2 oz)   11/07/23 103 kg (227 lb 3.2 oz)   03/17/23 105 kg (232 lb 8 oz)   02/06/23 102 kg (225 lb)   01/30/23 104 kg (228 lb 4 oz)   01/09/23 104 kg (228 lb 3 oz)   12/14/22 102 kg (225 lb)   02/02/22 107 kg (236 lb)     Physical Exam  Constitutional:       General: She is not in acute distress.     Appearance: Normal appearance.   HENT:      Head: Normocephalic and atraumatic.      Ears:      Comments: Left ear canal with scaly appearance.  Cardiovascular:      Rate and Rhythm: Normal rate and regular rhythm.      Pulses: Normal pulses.   Pulmonary:      Effort: Pulmonary effort is normal. No respiratory  distress.      Breath sounds: Normal breath sounds. No wheezing.   Abdominal:      General: Abdomen is flat. There is no distension.      Palpations: Abdomen is soft.      Tenderness: There is no abdominal tenderness. There is no guarding or rebound.   Musculoskeletal:      Right lower leg: No edema.      Left lower leg: No edema.   Neurological:      Mental Status: She is alert.   Psychiatric:         Mood and Affect: Mood normal.         Behavior: Behavior normal.        RECENT LABS:  Lab Results   Component Value Date    WBC 7.8 06/10/2024    HGB 15.0 06/10/2024    HCT 44.5 06/10/2024     06/10/2024    CHOL 150 06/10/2024    TRIG 99 06/10/2024    HDL 45.3 06/10/2024    ALT 22 06/10/2024    AST 16 06/10/2024     12/31/2024    K 3.8 12/31/2024    CL 99 12/31/2024    CREATININE 0.90 12/31/2024    BUN 16 12/31/2024    CO2 32 12/31/2024    TSH 0.66 06/10/2024    HGBA1C 7.6 (A) 01/14/2025     Lab Results   Component Value Date    GLUCOSE 120 (H) 12/31/2024    CALCIUM 9.7 12/31/2024     12/31/2024    K 3.8 12/31/2024    CO2 32 12/31/2024    CL 99 12/31/2024    BUN 16 12/31/2024    CREATININE 0.90 12/31/2024      Lab Results   Component Value Date    LDLCALC 85 06/10/2024     Lab Results   Component Value Date    HGBA1C 7.6 (A) 01/14/2025    HGBA1C 6.8 (A) 06/11/2024    HGBA1C 7.1 (A) 02/02/2022     Lab Results   Component Value Date    LDLCALC 85 06/10/2024    CREATININE 0.90 12/31/2024           P.S: This note was completed using Dragon voice recognition technology and may include unintended errors with respect to translation of words, typographical errors or grammar errors which may not have been identified while finalizing the chart.

## 2025-01-28 ENCOUNTER — APPOINTMENT (OUTPATIENT)
Dept: CARDIOLOGY | Facility: CLINIC | Age: 70
End: 2025-01-28
Payer: MEDICARE

## 2025-01-28 VITALS
HEIGHT: 62 IN | HEART RATE: 76 BPM | SYSTOLIC BLOOD PRESSURE: 134 MMHG | WEIGHT: 227.8 LBS | BODY MASS INDEX: 41.92 KG/M2 | DIASTOLIC BLOOD PRESSURE: 78 MMHG

## 2025-01-28 DIAGNOSIS — R01.1 HEART MURMUR: ICD-10-CM

## 2025-01-28 DIAGNOSIS — R94.31 ABNORMAL EKG: ICD-10-CM

## 2025-01-28 DIAGNOSIS — Z87.891 FORMER SMOKER: ICD-10-CM

## 2025-01-28 DIAGNOSIS — I36.1 NONRHEUMATIC TRICUSPID VALVE REGURGITATION: ICD-10-CM

## 2025-01-28 DIAGNOSIS — I34.0 NONRHEUMATIC MITRAL VALVE REGURGITATION: ICD-10-CM

## 2025-01-28 DIAGNOSIS — E78.2 MIXED HYPERLIPIDEMIA: ICD-10-CM

## 2025-01-28 DIAGNOSIS — E11.36 TYPE 2 DIABETES MELLITUS WITH DIABETIC CATARACT, WITHOUT LONG-TERM CURRENT USE OF INSULIN: ICD-10-CM

## 2025-01-28 DIAGNOSIS — R00.0 TACHYCARDIA: ICD-10-CM

## 2025-01-28 DIAGNOSIS — I10 BENIGN ESSENTIAL HYPERTENSION: ICD-10-CM

## 2025-01-28 DIAGNOSIS — E66.01 MORBID OBESITY WITH BODY MASS INDEX (BMI) OF 40.0 TO 44.9 IN ADULT (MULTI): ICD-10-CM

## 2025-01-28 PROCEDURE — 3008F BODY MASS INDEX DOCD: CPT | Performed by: INTERNAL MEDICINE

## 2025-01-28 PROCEDURE — 3075F SYST BP GE 130 - 139MM HG: CPT | Performed by: INTERNAL MEDICINE

## 2025-01-28 PROCEDURE — 1036F TOBACCO NON-USER: CPT | Performed by: INTERNAL MEDICINE

## 2025-01-28 PROCEDURE — 1159F MED LIST DOCD IN RCRD: CPT | Performed by: INTERNAL MEDICINE

## 2025-01-28 PROCEDURE — 3078F DIAST BP <80 MM HG: CPT | Performed by: INTERNAL MEDICINE

## 2025-01-28 PROCEDURE — 99214 OFFICE O/P EST MOD 30 MIN: CPT | Performed by: INTERNAL MEDICINE

## 2025-01-28 RX ORDER — METOPROLOL SUCCINATE 25 MG/1
25 TABLET, EXTENDED RELEASE ORAL DAILY
Qty: 90 TABLET | Refills: 3 | Status: SHIPPED | OUTPATIENT
Start: 2025-01-28 | End: 2026-01-28

## 2025-01-28 RX ORDER — CHOLECALCIFEROL (VITAMIN D3) 25 MCG
1000 TABLET ORAL DAILY
COMMUNITY

## 2025-01-28 ASSESSMENT — ENCOUNTER SYMPTOMS
CARDIOVASCULAR NEGATIVE: 1
RESPIRATORY NEGATIVE: 1
NEUROLOGICAL NEGATIVE: 1
CONSTITUTIONAL NEGATIVE: 1

## 2025-01-28 NOTE — PATIENT INSTRUCTIONS
Decrease Metoprolol Succ. To 25 mg a day  Patient educated on proper medication use.   Patient educated on risk factor modification.   Please bring any lab results from other providers / physicians to your next appointment.     Please bring all medicines, vitamins, and herbal supplements with you when you come to the office.     Prescriptions will not be filled unless you are compliant with your follow up appointments or have a follow up appointment scheduled as per instruction of your physician. Refills should be requested at the time of your visit.  6 month visit

## 2025-01-28 NOTE — PROGRESS NOTES
CARDIOLOGY OFFICE VISIT      CHIEF COMPLAINT  Chief Complaint   Patient presents with    Follow-up     6 month follow up        HISTORY OF PRESENT ILLNESS  Ilda Cronin is a 69 y.o. year old female patient with hypertension, resting tachycardia and moderate obesity [place] presents for follow-up.  She apparently has not been very diligent with her blood pressure medications so her blood pressures were running high.  She had an echocardiogram that shows normal LV function with trace mitral and tricuspid regurgitation in January 2023.  She continues to deny any chest pain or significant shortness of breath.  She had a history of dyslipidemia for which she is on lipid-lowering therapy and recent labs from June 2024 shows total cholesterol is 150 which is down from 223, HDL is 45 LDL is 85 which is down from 150 and triglyceride is 99.    ASSESSMENT AND PLAN:  1.  Hypertension: Blood pressure is much better controlled since we added low-dose HCTZ to her medications which she is tolerating.    2.  Dyslipidemia: Lipid profile is acceptable on current medications which we will continue.  3.  Overweight: She is encouraged to lose weight and exercise.      Problem List Items Addressed This Visit          Cardiac and Vasculature    Abnormal EKG    Benign essential hypertension    Relevant Medications    metoprolol succinate XL (Toprol-XL) 25 mg 24 hr tablet    Heart murmur    Hyperlipidemia, unspecified    Tachycardia    Mitral regurgitation    Relevant Medications    metoprolol succinate XL (Toprol-XL) 25 mg 24 hr tablet    Tricuspid regurgitation    Relevant Medications    metoprolol succinate XL (Toprol-XL) 25 mg 24 hr tablet       Endocrine/Metabolic    Morbid obesity with body mass index (BMI) of 40.0 to 44.9 in adult (Multi)    Type 2 diabetes mellitus with diabetic cataract, without long-term current use of insulin       Tobacco    Former smoker       Recent Cardiovascular Testing:    Echo-  Stress-  Cath-  Carotid  "Ultrasound-    Past Medical History  Past Medical History:   Diagnosis Date    Arthritis 2010    Cataract     Diabetes mellitus (Multi) 2014    Headache 1974    Heart murmur 1954    Hypertension 2005    Visual impairment 1966       Social History  Social History     Tobacco Use    Smoking status: Former     Current packs/day: 0.00     Types: Cigarettes     Start date:      Quit date: 3/8/1995     Years since quittin.9     Passive exposure: Never    Smokeless tobacco: Never   Vaping Use    Vaping status: Never Used   Substance Use Topics    Alcohol use: Not Currently     Comment: Occassional, 2-3x a week around holidays    Drug use: Never       Family History     Family History   Problem Relation Name Age of Onset    Thyroid disease Mother Brittanie Allen     Other (cardiac pacemaker) Father Marcos Allen Sr     Hypertension Father Marcos Allen Sr     Heart disease Father Marcos Allen Sr     Heart disease Paternal Grandfather Adolph Allen Scott     Hypertension Paternal Grandfather Adolph Scott     Cancer Sister Kings Allen     Hypertension Sister Kings Allen     Arthritis Sister Kings Allen     Diabetes Sister Kings Allen     Heart disease Brother Marcos Mehtaat II     Diabetes Brother Marcos Mehtaat II     Heart disease Brother Terence Allen     Early natural death Brother Terence Allen         Allergies:  Allergies   Allergen Reactions    Chlorpheniramine-Pseudoephed Other and Unknown     \"Anesthesia\"--patient's mouth wouldn't open-UNSURE NAME OF DRUG-WILL CALL TODAY WITH NAME    \"Anesthesia\"--patient's mouth wouldn't open        Outpatient Medications:  Current Outpatient Medications   Medication Instructions    atorvastatin (LIPITOR) 20 mg, oral, Daily    cholecalciferol (VITAMIN D3) 1,000 Units, Daily    ergocalciferol (VITAMIN D-2) 50,000 Units, oral, Once Weekly    losartan-hydrochlorothiazide (Hyzaar) 100-25 mg tablet 1 tablet, oral, Daily    metFORMIN (GLUCOPHAGE) 1,000 mg, oral, 2 times daily " "(morning and late afternoon)    metoprolol succinate XL (TOPROL-XL) 25 mg, oral, Daily, Do not crush or chew.        Recent Lab Results:    CBC:   Lab Results   Component Value Date    WBC 7.8 06/10/2024    RBC 4.78 06/10/2024    HGB 15.0 06/10/2024    HCT 44.5 06/10/2024     06/10/2024        CMP:    Lab Results   Component Value Date     12/31/2024    K 3.8 12/31/2024    CL 99 12/31/2024    CO2 32 12/31/2024    BUN 16 12/31/2024    CREATININE 0.90 12/31/2024    GLUCOSE 120 (H) 12/31/2024    CALCIUM 9.7 12/31/2024       Magnesium:    No results found for: \"MG\"    Lipid Profile:    Lab Results   Component Value Date    TRIG 99 06/10/2024    HDL 45.3 06/10/2024    LDLCALC 85 06/10/2024       TSH:    Lab Results   Component Value Date    TSH 0.66 06/10/2024       BNP:   No results found for: \"BNP\"     PT/INR:    No results found for: \"PROTIME\", \"INR\"    HgBA1c:    Lab Results   Component Value Date    HGBA1C 7.6 (A) 01/14/2025       BMP:  Lab Results   Component Value Date     12/31/2024     06/10/2024     01/06/2023     02/02/2022    K 3.8 12/31/2024    K 4.0 06/10/2024    K 4.3 01/06/2023    K 4.1 02/02/2022    CL 99 12/31/2024     06/10/2024     01/06/2023     02/02/2022    CO2 32 12/31/2024    CO2 30 06/10/2024    CO2 29 01/06/2023    CO2 26 02/02/2022    BUN 16 12/31/2024    BUN 13 06/10/2024    BUN 13 01/06/2023    BUN 11 02/02/2022    CREATININE 0.90 12/31/2024    CREATININE 0.84 06/10/2024    CREATININE 0.81 01/06/2023    CREATININE 0.76 02/02/2022       CBC:  Lab Results   Component Value Date    WBC 7.8 06/10/2024    WBC 8.1 01/06/2023    WBC 9.9 02/02/2022    RBC 4.78 06/10/2024    RBC 4.90 01/06/2023    RBC 4.98 02/02/2022    HGB 15.0 06/10/2024    HGB 14.7 01/06/2023    HGB 15.0 02/02/2022    HCT 44.5 06/10/2024    HCT 44.8 01/06/2023    HCT 45.3 02/02/2022    MCV 93 06/10/2024    MCV 91 01/06/2023    MCV 91 02/02/2022    MCH 31.4 06/10/2024    MCHC " "33.7 06/10/2024    MCHC 32.8 01/06/2023    MCHC 33.1 02/02/2022    RDW 12.3 06/10/2024    RDW 12.1 01/06/2023    RDW 12.6 02/02/2022     06/10/2024     01/06/2023     02/02/2022       Cardiac Enzymes:    No results found for: \"TROPHS\"    Hepatic Function Panel:    Lab Results   Component Value Date    ALKPHOS 121 06/10/2024    ALT 22 06/10/2024    AST 16 06/10/2024    PROT 7.0 06/10/2024    BILITOT 0.5 06/10/2024         REVIEW OF SYSTEMS  Review of Systems   Constitutional: Negative.   Cardiovascular: Negative.    Respiratory: Negative.     Neurological: Negative.    All other systems reviewed and are negative.      VITALS  Vitals:    01/28/25 1012   BP: 134/78   Pulse: 76     Wt Readings from Last 4 Encounters:   01/28/25 103 kg (227 lb 12.8 oz)   01/14/25 102 kg (224 lb 12.8 oz)   07/01/24 104 kg (228 lb 9.6 oz)   06/11/24 105 kg (232 lb 3.2 oz)       PHYSICAL EXAM  Constitutional:       Appearance: Healthy appearance. Not in distress.   Eyes:      Conjunctiva/sclera: Conjunctivae normal.      Pupils: Pupils are equal, round, and reactive to light.   Neck:      Vascular: No JVR. JVD normal.   Pulmonary:      Effort: Pulmonary effort is normal.      Breath sounds: Normal breath sounds. No wheezing. No rhonchi. No rales.   Chest:      Chest wall: Not tender to palpatation.   Cardiovascular:      PMI at left midclavicular line. Normal rate. Regular rhythm. Normal S1. Normal S2.       Murmurs: There is no murmur.      No gallop.  No click. No rub.   Pulses:     Intact distal pulses.   Edema:     Peripheral edema absent.   Abdominal:      Tenderness: There is no abdominal tenderness.   Musculoskeletal: Normal range of motion.         General: No tenderness.      Cervical back: Normal range of motion. Skin:     General: Skin is warm and dry.   Neurological:      General: No focal deficit present.      Mental Status: Alert and oriented to person, place and time.             "

## 2025-04-15 ENCOUNTER — APPOINTMENT (OUTPATIENT)
Dept: PRIMARY CARE | Facility: CLINIC | Age: 70
End: 2025-04-15
Payer: MEDICARE

## 2025-04-21 DIAGNOSIS — E78.5 HYPERLIPIDEMIA, UNSPECIFIED HYPERLIPIDEMIA TYPE: ICD-10-CM

## 2025-04-22 RX ORDER — ATORVASTATIN CALCIUM 20 MG/1
20 TABLET, FILM COATED ORAL DAILY
Qty: 90 TABLET | Refills: 3 | Status: SHIPPED | OUTPATIENT
Start: 2025-04-22

## 2025-08-12 ENCOUNTER — APPOINTMENT (OUTPATIENT)
Dept: CARDIOLOGY | Facility: CLINIC | Age: 70
End: 2025-08-12
Payer: MEDICARE

## 2025-08-14 DIAGNOSIS — I10 BENIGN ESSENTIAL HYPERTENSION: ICD-10-CM

## 2025-08-19 RX ORDER — LOSARTAN POTASSIUM AND HYDROCHLOROTHIAZIDE 25; 100 MG/1; MG/1
1 TABLET ORAL DAILY
Qty: 30 TABLET | Refills: 0 | Status: SHIPPED | OUTPATIENT
Start: 2025-08-19 | End: 2025-09-18

## 2025-09-08 ENCOUNTER — APPOINTMENT (OUTPATIENT)
Dept: CARDIOLOGY | Facility: CLINIC | Age: 70
End: 2025-09-08
Payer: MEDICARE